# Patient Record
Sex: FEMALE | Race: WHITE | NOT HISPANIC OR LATINO | Employment: PART TIME | URBAN - METROPOLITAN AREA
[De-identification: names, ages, dates, MRNs, and addresses within clinical notes are randomized per-mention and may not be internally consistent; named-entity substitution may affect disease eponyms.]

---

## 2017-09-23 ENCOUNTER — APPOINTMENT (EMERGENCY)
Dept: CT IMAGING | Facility: HOSPITAL | Age: 57
End: 2017-09-23
Payer: COMMERCIAL

## 2017-09-23 ENCOUNTER — HOSPITAL ENCOUNTER (EMERGENCY)
Facility: HOSPITAL | Age: 57
Discharge: HOME | End: 2017-09-23
Attending: EMERGENCY MEDICINE
Payer: COMMERCIAL

## 2017-09-23 VITALS
HEART RATE: 57 BPM | WEIGHT: 120 LBS | HEIGHT: 62 IN | RESPIRATION RATE: 19 BRPM | SYSTOLIC BLOOD PRESSURE: 136 MMHG | DIASTOLIC BLOOD PRESSURE: 83 MMHG | OXYGEN SATURATION: 99 % | BODY MASS INDEX: 22.08 KG/M2 | TEMPERATURE: 98 F

## 2017-09-23 DIAGNOSIS — R10.32 LEFT LOWER QUADRANT PAIN: Primary | ICD-10-CM

## 2017-09-23 LAB
ALBUMIN SERPL BCP-MCNC: 4.1 G/DL (ref 3.5–5)
ALP SERPL-CCNC: 80 U/L (ref 46–116)
ALT SERPL W P-5'-P-CCNC: 29 U/L (ref 12–78)
ANION GAP SERPL CALCULATED.3IONS-SCNC: 9 MMOL/L (ref 4–13)
AST SERPL W P-5'-P-CCNC: 21 U/L (ref 5–45)
BASOPHILS # BLD AUTO: 0.02 THOUSANDS/ΜL (ref 0–0.1)
BASOPHILS NFR BLD AUTO: 0 % (ref 0–1)
BILIRUB SERPL-MCNC: 0.7 MG/DL (ref 0.2–1)
BILIRUB UR QL STRIP: NEGATIVE
BUN SERPL-MCNC: 13 MG/DL (ref 5–25)
CALCIUM SERPL-MCNC: 9.2 MG/DL (ref 8.3–10.1)
CHLORIDE SERPL-SCNC: 102 MMOL/L (ref 100–108)
CLARITY UR: CLEAR
CO2 SERPL-SCNC: 26 MMOL/L (ref 21–32)
COLOR UR: YELLOW
CREAT SERPL-MCNC: 0.99 MG/DL (ref 0.6–1.3)
EOSINOPHIL # BLD AUTO: 0.14 THOUSAND/ΜL (ref 0–0.61)
EOSINOPHIL NFR BLD AUTO: 2 % (ref 0–6)
ERYTHROCYTE [DISTWIDTH] IN BLOOD BY AUTOMATED COUNT: 12.7 % (ref 11.6–15.1)
GFR SERPL CREATININE-BSD FRML MDRD: 63 ML/MIN/1.73SQ M
GLUCOSE SERPL-MCNC: 113 MG/DL (ref 65–140)
GLUCOSE UR STRIP-MCNC: NEGATIVE MG/DL
HCT VFR BLD AUTO: 44.3 % (ref 34.8–46.1)
HGB BLD-MCNC: 15.1 G/DL (ref 11.5–15.4)
HGB UR QL STRIP.AUTO: NEGATIVE
KETONES UR STRIP-MCNC: ABNORMAL MG/DL
LEUKOCYTE ESTERASE UR QL STRIP: NEGATIVE
LYMPHOCYTES # BLD AUTO: 1.09 THOUSANDS/ΜL (ref 0.6–4.47)
LYMPHOCYTES NFR BLD AUTO: 14 % (ref 14–44)
MCH RBC QN AUTO: 31.8 PG (ref 26.8–34.3)
MCHC RBC AUTO-ENTMCNC: 34.1 G/DL (ref 31.4–37.4)
MCV RBC AUTO: 93 FL (ref 82–98)
MONOCYTES # BLD AUTO: 0.37 THOUSAND/ΜL (ref 0.17–1.22)
MONOCYTES NFR BLD AUTO: 5 % (ref 4–12)
NEUTROPHILS # BLD AUTO: 6.2 THOUSANDS/ΜL (ref 1.85–7.62)
NEUTS SEG NFR BLD AUTO: 79 % (ref 43–75)
NITRITE UR QL STRIP: NEGATIVE
PH UR STRIP.AUTO: 6.5 [PH] (ref 4.5–8)
PLATELET # BLD AUTO: 304 THOUSANDS/UL (ref 149–390)
PMV BLD AUTO: 9.9 FL (ref 8.9–12.7)
POTASSIUM SERPL-SCNC: 3.9 MMOL/L (ref 3.5–5.3)
PROT SERPL-MCNC: 7.8 G/DL (ref 6.4–8.2)
PROT UR STRIP-MCNC: NEGATIVE MG/DL
RBC # BLD AUTO: 4.75 MILLION/UL (ref 3.81–5.12)
SODIUM SERPL-SCNC: 137 MMOL/L (ref 136–145)
SP GR UR STRIP.AUTO: <=1.005 (ref 1–1.03)
UROBILINOGEN UR QL STRIP.AUTO: 0.2 E.U./DL
WBC # BLD AUTO: 7.82 THOUSAND/UL (ref 4.31–10.16)

## 2017-09-23 PROCEDURE — 81003 URINALYSIS AUTO W/O SCOPE: CPT | Performed by: EMERGENCY MEDICINE

## 2017-09-23 PROCEDURE — 96375 TX/PRO/DX INJ NEW DRUG ADDON: CPT

## 2017-09-23 PROCEDURE — 96374 THER/PROPH/DIAG INJ IV PUSH: CPT

## 2017-09-23 PROCEDURE — 85025 COMPLETE CBC W/AUTO DIFF WBC: CPT | Performed by: EMERGENCY MEDICINE

## 2017-09-23 PROCEDURE — 36415 COLL VENOUS BLD VENIPUNCTURE: CPT | Performed by: EMERGENCY MEDICINE

## 2017-09-23 PROCEDURE — 99284 EMERGENCY DEPT VISIT MOD MDM: CPT

## 2017-09-23 PROCEDURE — 80053 COMPREHEN METABOLIC PANEL: CPT | Performed by: EMERGENCY MEDICINE

## 2017-09-23 PROCEDURE — 74177 CT ABD & PELVIS W/CONTRAST: CPT

## 2017-09-23 RX ORDER — DICYCLOMINE HCL 20 MG
20 TABLET ORAL EVERY 6 HOURS
Qty: 20 TABLET | Refills: 0 | Status: SHIPPED | OUTPATIENT
Start: 2017-09-23 | End: 2017-10-09 | Stop reason: ALTCHOICE

## 2017-09-23 RX ORDER — ONDANSETRON 2 MG/ML
4 INJECTION INTRAMUSCULAR; INTRAVENOUS ONCE
Status: COMPLETED | OUTPATIENT
Start: 2017-09-23 | End: 2017-09-23

## 2017-09-23 RX ADMIN — ONDANSETRON 4 MG: 2 INJECTION INTRAMUSCULAR; INTRAVENOUS at 09:47

## 2017-09-23 RX ADMIN — IOHEXOL 50 ML: 240 INJECTION, SOLUTION INTRATHECAL; INTRAVASCULAR; INTRAVENOUS; ORAL at 10:05

## 2017-09-23 RX ADMIN — HYDROMORPHONE HYDROCHLORIDE 1 MG: 1 INJECTION, SOLUTION INTRAMUSCULAR; INTRAVENOUS; SUBCUTANEOUS at 09:48

## 2017-09-23 RX ADMIN — IOHEXOL 100 ML: 350 INJECTION, SOLUTION INTRAVENOUS at 11:36

## 2017-09-26 ENCOUNTER — ALLSCRIPTS OFFICE VISIT (OUTPATIENT)
Dept: OTHER | Facility: OTHER | Age: 57
End: 2017-09-26

## 2017-09-26 DIAGNOSIS — Z12.31 ENCOUNTER FOR SCREENING MAMMOGRAM FOR MALIGNANT NEOPLASM OF BREAST: ICD-10-CM

## 2017-09-26 DIAGNOSIS — M54.10 RADICULOPATHY: ICD-10-CM

## 2017-10-08 ENCOUNTER — GENERIC CONVERSION - ENCOUNTER (OUTPATIENT)
Dept: OTHER | Facility: OTHER | Age: 57
End: 2017-10-08

## 2017-10-09 ENCOUNTER — HOSPITAL ENCOUNTER (EMERGENCY)
Facility: HOSPITAL | Age: 57
Discharge: HOME | End: 2017-10-09
Attending: EMERGENCY MEDICINE | Admitting: EMERGENCY MEDICINE
Payer: COMMERCIAL

## 2017-10-09 VITALS
WEIGHT: 116 LBS | DIASTOLIC BLOOD PRESSURE: 69 MMHG | RESPIRATION RATE: 22 BRPM | BODY MASS INDEX: 21.22 KG/M2 | SYSTOLIC BLOOD PRESSURE: 112 MMHG | HEART RATE: 117 BPM | TEMPERATURE: 98.3 F | OXYGEN SATURATION: 99 %

## 2017-10-09 DIAGNOSIS — M79.652 PAIN OF LEFT THIGH: Primary | ICD-10-CM

## 2017-10-09 PROCEDURE — 99283 EMERGENCY DEPT VISIT LOW MDM: CPT

## 2017-10-09 PROCEDURE — 96372 THER/PROPH/DIAG INJ SC/IM: CPT

## 2017-10-09 RX ORDER — NAPROXEN 500 MG/1
500 TABLET ORAL 2 TIMES DAILY WITH MEALS
Qty: 30 TABLET | Refills: 0 | Status: SHIPPED | OUTPATIENT
Start: 2017-10-09 | End: 2020-05-05 | Stop reason: ALTCHOICE

## 2017-10-09 RX ORDER — KETOROLAC TROMETHAMINE 30 MG/ML
30 INJECTION, SOLUTION INTRAMUSCULAR; INTRAVENOUS ONCE
Status: COMPLETED | OUTPATIENT
Start: 2017-10-09 | End: 2017-10-09

## 2017-10-09 RX ORDER — METHOCARBAMOL 500 MG/1
500 TABLET, FILM COATED ORAL ONCE
Status: COMPLETED | OUTPATIENT
Start: 2017-10-09 | End: 2017-10-09

## 2017-10-09 RX ORDER — METHOCARBAMOL 500 MG/1
500 TABLET, FILM COATED ORAL 4 TIMES DAILY PRN
Qty: 20 TABLET | Refills: 0 | Status: SHIPPED | OUTPATIENT
Start: 2017-10-09 | End: 2020-04-22 | Stop reason: SDUPTHER

## 2017-10-09 RX ORDER — PREDNISONE 10 MG/1
TABLET ORAL DAILY
COMMUNITY
End: 2020-04-22 | Stop reason: ALTCHOICE

## 2017-10-09 RX ADMIN — KETOROLAC TROMETHAMINE 30 MG: 30 INJECTION, SOLUTION INTRAMUSCULAR at 19:47

## 2017-10-09 RX ADMIN — METHOCARBAMOL 500 MG: 500 TABLET ORAL at 19:46

## 2017-10-09 NOTE — DISCHARGE INSTRUCTIONS
Leg Pain   WHAT YOU NEED TO KNOW:   Leg pain may be caused by a variety of health conditions  Your tests did not show any broken bones or blood clots  DISCHARGE INSTRUCTIONS:   Return to the emergency department if:   · You have a fever  · Your leg starts to swell  · Your leg pain gets worse  · You have numbness or tingling in your leg or toes  · You cannot put any weight on or move your leg  Contact your healthcare provider if:   · Your pain does not decrease, even after treatment  · You have questions or concerns about your condition or care  Medicines:   · NSAIDs , such as ibuprofen, help decrease swelling, pain, and fever  This medicine is available with or without a doctor's order  NSAIDs can cause stomach bleeding or kidney problems in certain people  If you take blood thinner medicine, always ask your healthcare provider if NSAIDs are safe for you  Always read the medicine label and follow directions  · Take your medicine as directed  Contact your healthcare provider if you think your medicine is not helping or if you have side effects  Tell him of her if you are allergic to any medicine  Keep a list of the medicines, vitamins, and herbs you take  Include the amounts, and when and why you take them  Bring the list or the pill bottles to follow-up visits  Carry your medicine list with you in case of an emergency  Follow up with your healthcare provider as directed: You may need more tests to find the cause of your leg pain  You may need to see an orthopedic specialist or a physical therapist  Write down your questions so you remember to ask them during your visits  Manage your leg pain:   · Rest  your injured leg so that it can heal  You may need an immobilizer, brace, or splint to limit the movement of your leg  You may need to avoid putting any weight on your leg for at least 48 hours  Return to normal activities as directed      · Ice  the injury for 20 minutes every 4 hours for up to 24 hours, or as directed  Use an ice pack, or put crushed ice in a plastic bag  Cover it with a towel to protect your skin  Ice helps prevent tissue damage and decreases swelling and pain  · Elevate  your injured leg above the level of your heart as often as you can  This will help decrease swelling and pain  If possible, prop your leg on pillows or blankets to keep the area elevated comfortably  · Use assistive devices as directed  You may need to use a cane or crutches  Assistive devices help decrease pain and pressure on your leg when you walk  Ask your healthcare provider for more information about assistive devices and how to use them correctly  · Maintain a healthy weight  Extra body weight can cause pressure and pain in your hip, knee, and ankle joints  Ask your healthcare provider how much you should weigh  Ask him to help you create a weight loss plan if you are overweight  © 2017 [employer] Information is for End User's use only and may not be sold, redistributed or otherwise used for commercial purposes  All illustrations and images included in CareNotes® are the copyrighted property of [employer]  or Virgil Crowe  The above information is an  only  It is not intended as medical advice for individual conditions or treatments  Talk to your doctor, nurse or pharmacist before following any medical regimen to see if it is safe and effective for you

## 2017-10-10 NOTE — ED PROVIDER NOTES
History  Chief Complaint   Patient presents with    Back Pain     Pt stated this started 3 weeks ago, PCP seen and diagnosed sciatica, steroids given, pt stated last week went to work feeling better and saturday had a sudden onset,  PCP called in steroids   Leg Pain       Patient complaining of left thigh pain 3 weeks duration  She went through a course of steroids in the pain improved but then she went in clean the house and the pain returned  Pain radiates up to the left buttock is described as a sharp sensation  She actually has been taking Percocet without relief  She denies any numbness or tingling any loss of bowel or bladder control  She denies any weakness or fevers  Back Pain   Associated symptoms: leg pain    Leg Pain   Associated symptoms: back pain        Prior to Admission Medications   Prescriptions Last Dose Informant Patient Reported? Taking?   predniSONE 10 mg tablet 10/9/2017 at Unknown time  Yes Yes   Sig: Take by mouth daily      Facility-Administered Medications: None       History reviewed  No pertinent past medical history  Past Surgical History:   Procedure Laterality Date    APPENDECTOMY       SECTION         History reviewed  No pertinent family history  I have reviewed and agree with the history as documented  Social History   Substance Use Topics    Smoking status: Never Smoker    Smokeless tobacco: Never Used    Alcohol use Yes        Review of Systems   Musculoskeletal: Positive for back pain  All other systems reviewed and are negative  Physical Exam  ED Triage Vitals [10/09/17 1833]   Temperature Pulse Respirations Blood Pressure SpO2   98 3 °F (36 8 °C) (!) 117 22 112/69 99 %      Temp Source Heart Rate Source Patient Position - Orthostatic VS BP Location FiO2 (%)   Tympanic Monitor Sitting Right arm --      Pain Score       Worst Possible Pain           Physical Exam   Constitutional: She appears well-developed and well-nourished  HENT:   Mouth/Throat: Oropharynx is clear and moist    Eyes: Conjunctivae and EOM are normal  Pupils are equal, round, and reactive to light  Neck: Normal range of motion  Neck supple  No spinous process tenderness present  Cardiovascular: Normal rate, regular rhythm, normal heart sounds and intact distal pulses  Pulmonary/Chest: Effort normal and breath sounds normal  No respiratory distress  She has no wheezes  Abdominal: Soft  Bowel sounds are normal  She exhibits no distension  There is no tenderness  Musculoskeletal: Normal range of motion  Lumbar back: She exhibits tenderness, pain and spasm  She exhibits normal range of motion, no bony tenderness, no swelling, no edema and normal pulse  Back:         Left upper leg: She exhibits no tenderness, no bony tenderness, no swelling, no edema, no deformity and no laceration  Negative straight leg raise bilateral   Neurological: She is alert  She has normal strength  No sensory deficit  GCS eye subscore is 4  GCS verbal subscore is 5  GCS motor subscore is 6  Skin: Skin is warm and dry  No rash noted  Psychiatric: She has a normal mood and affect  Nursing note and vitals reviewed  ED Medications  Medications   ketorolac (TORADOL) 30 mg/mL injection 30 mg (30 mg Intramuscular Given 10/9/17 1947)   methocarbamol (ROBAXIN) tablet 500 mg (500 mg Oral Given 10/9/17 1946)       Diagnostic Studies  Labs Reviewed - No data to display    No orders to display       Procedures  Procedures      Phone Contacts  ED Phone Contact    ED Course  ED Course                                MDM  Number of Diagnoses or Management Options  Pain of left thigh: established and worsening  Patient Progress  Patient progress: improved    CritCare Time    Disposition  Final diagnoses:   Pain of left thigh     ED Disposition     ED Disposition Condition Comment    Discharge  Jo Ann Huber discharge to home/self care      Condition at discharge: Good Follow-up Information     Follow up With Specialties Details Why [address], [address] In 2 days As needed [address] [facility] [place] [address] [place] [address]  [phone]          Discharge Medication List as of 10/9/2017  7:41 PM      START taking these medications    Details   methocarbamol (ROBAXIN) 500 mg tablet Take 1 tablet by mouth 4 (four) times a day as needed for muscle spasms, Starting Mon 10/9/2017, Print      naproxen (NAPROSYN) 500 mg tablet Take 1 tablet by mouth 2 (two) times a day with meals, Starting Mon 10/9/2017, Print         CONTINUE these medications which have NOT CHANGED    Details   predniSONE 10 mg tablet Take by mouth daily, Historical Med           No discharge procedures on file      ED Provider  Electronically Signed by       Hemanth Cardona DO  10/10/17 0020

## 2017-10-19 ENCOUNTER — GENERIC CONVERSION - ENCOUNTER (OUTPATIENT)
Dept: OTHER | Facility: OTHER | Age: 57
End: 2017-10-19

## 2017-10-26 ENCOUNTER — APPOINTMENT (OUTPATIENT)
Dept: PHYSICAL THERAPY | Facility: CLINIC | Age: 57
End: 2017-10-26
Payer: COMMERCIAL

## 2017-10-26 DIAGNOSIS — M54.10 RADICULOPATHY: ICD-10-CM

## 2017-10-26 PROCEDURE — G8990 OTHER PT/OT CURRENT STATUS: HCPCS

## 2017-10-26 PROCEDURE — 97161 PT EVAL LOW COMPLEX 20 MIN: CPT

## 2017-10-26 PROCEDURE — G8991 OTHER PT/OT GOAL STATUS: HCPCS

## 2017-10-30 ENCOUNTER — GENERIC CONVERSION - ENCOUNTER (OUTPATIENT)
Dept: OTHER | Facility: OTHER | Age: 57
End: 2017-10-30

## 2018-01-13 VITALS
HEART RATE: 68 BPM | DIASTOLIC BLOOD PRESSURE: 88 MMHG | RESPIRATION RATE: 14 BRPM | BODY MASS INDEX: 21.4 KG/M2 | TEMPERATURE: 97.6 F | HEIGHT: 62 IN | WEIGHT: 116.31 LBS | SYSTOLIC BLOOD PRESSURE: 118 MMHG

## 2018-01-15 NOTE — MISCELLANEOUS
Message  Message Free Text Note Form: Patient was seen week and half ago in our office and was treated with prednisone for which she call sciatic pain  She did very well and suddenly today he came back  She has pain in her mid but and feels like there is a hot poker going straight down the back leg  She feels like her piriformis muscle must be in spasm  She also has a call out to her chiropractor  She has no loss of bowel or bladder continence, no lower extremity weakness  Plan: We will redo another course of steroids  If this does not take care of the patient will need to see us again  Seeing her chiropractor sounds like a good idea  She is not to take any other nonsteroidals well on the prednisone  She can however take arthritis strength Tylenol 2 together 3 times daily  Anything changes or she needs help call p r n  Plan    1   PredniSONE 10 MG Oral Tablet; TAKE 4 TABLETS DAILY FOR 2 DAYS,3   TABLETS DAILY FOR 2 DAYS, 2 TABLETS DAILY FOR 2 DAYS AND 1 TABLET DAILY FOR   2 DAYS, THEN STOP    Signatures   Electronically signed by : Suresh Novak DO; Oct  8 2017 10:48AM EST                       (Author)

## 2018-01-22 VITALS
DIASTOLIC BLOOD PRESSURE: 82 MMHG | RESPIRATION RATE: 12 BRPM | HEIGHT: 61 IN | WEIGHT: 113 LBS | BODY MASS INDEX: 21.34 KG/M2 | SYSTOLIC BLOOD PRESSURE: 114 MMHG | HEART RATE: 62 BPM

## 2019-08-23 ENCOUNTER — TELEPHONE (OUTPATIENT)
Dept: FAMILY MEDICINE CLINIC | Facility: CLINIC | Age: 59
End: 2019-08-23

## 2020-04-22 ENCOUNTER — TELEMEDICINE (OUTPATIENT)
Dept: FAMILY MEDICINE CLINIC | Facility: CLINIC | Age: 60
End: 2020-04-22
Payer: COMMERCIAL

## 2020-04-22 DIAGNOSIS — M25.552 ACUTE PAIN OF LEFT HIP: ICD-10-CM

## 2020-04-22 DIAGNOSIS — M54.42 ACUTE LEFT-SIDED LOW BACK PAIN WITH LEFT-SIDED SCIATICA: ICD-10-CM

## 2020-04-22 DIAGNOSIS — Z12.39 SCREENING FOR MALIGNANT NEOPLASM OF BREAST: ICD-10-CM

## 2020-04-22 DIAGNOSIS — M54.42 ACUTE LEFT-SIDED LOW BACK PAIN WITH LEFT-SIDED SCIATICA: Primary | ICD-10-CM

## 2020-04-22 PROCEDURE — 99213 OFFICE O/P EST LOW 20 MIN: CPT | Performed by: FAMILY MEDICINE

## 2020-04-22 RX ORDER — PREDNISONE 10 MG/1
TABLET ORAL
Qty: 20 TABLET | Refills: 0 | Status: SHIPPED | OUTPATIENT
Start: 2020-04-22

## 2020-04-22 RX ORDER — METHOCARBAMOL 500 MG/1
500 TABLET, FILM COATED ORAL 4 TIMES DAILY PRN
Qty: 20 TABLET | Refills: 0 | Status: SHIPPED | OUTPATIENT
Start: 2020-04-22 | End: 2020-04-22 | Stop reason: SDUPTHER

## 2020-04-22 RX ORDER — METHOCARBAMOL 500 MG/1
500 TABLET, FILM COATED ORAL 4 TIMES DAILY PRN
Qty: 20 TABLET | Refills: 0 | Status: SHIPPED | OUTPATIENT
Start: 2020-04-22 | End: 2020-05-05 | Stop reason: ALTCHOICE

## 2020-04-23 ENCOUNTER — TELEPHONE (OUTPATIENT)
Dept: FAMILY MEDICINE CLINIC | Facility: CLINIC | Age: 60
End: 2020-04-23

## 2020-05-05 ENCOUNTER — OFFICE VISIT (OUTPATIENT)
Dept: FAMILY MEDICINE CLINIC | Facility: CLINIC | Age: 60
End: 2020-05-05
Payer: COMMERCIAL

## 2020-05-05 VITALS
HEIGHT: 61 IN | RESPIRATION RATE: 15 BRPM | HEART RATE: 80 BPM | WEIGHT: 111.9 LBS | DIASTOLIC BLOOD PRESSURE: 64 MMHG | TEMPERATURE: 98.4 F | SYSTOLIC BLOOD PRESSURE: 108 MMHG | BODY MASS INDEX: 21.13 KG/M2

## 2020-05-05 DIAGNOSIS — M54.42 ACUTE LEFT-SIDED LOW BACK PAIN WITH LEFT-SIDED SCIATICA: Primary | ICD-10-CM

## 2020-05-05 PROCEDURE — 1036F TOBACCO NON-USER: CPT | Performed by: NURSE PRACTITIONER

## 2020-05-05 PROCEDURE — 3008F BODY MASS INDEX DOCD: CPT | Performed by: NURSE PRACTITIONER

## 2020-05-05 PROCEDURE — 99213 OFFICE O/P EST LOW 20 MIN: CPT | Performed by: NURSE PRACTITIONER

## 2020-05-11 ENCOUNTER — EVALUATION (OUTPATIENT)
Dept: PHYSICAL THERAPY | Facility: CLINIC | Age: 60
End: 2020-05-11
Payer: COMMERCIAL

## 2020-05-11 DIAGNOSIS — M54.42 ACUTE LEFT-SIDED LOW BACK PAIN WITH LEFT-SIDED SCIATICA: ICD-10-CM

## 2020-05-11 PROBLEM — M54.50 LOW BACK PAIN: Status: ACTIVE | Noted: 2020-05-11

## 2020-05-11 PROCEDURE — 97161 PT EVAL LOW COMPLEX 20 MIN: CPT

## 2020-05-11 PROCEDURE — 97110 THERAPEUTIC EXERCISES: CPT

## 2020-05-18 ENCOUNTER — OFFICE VISIT (OUTPATIENT)
Dept: PHYSICAL THERAPY | Facility: CLINIC | Age: 60
End: 2020-05-18
Payer: COMMERCIAL

## 2020-05-18 DIAGNOSIS — M54.42 ACUTE LEFT-SIDED LOW BACK PAIN WITH LEFT-SIDED SCIATICA: Primary | ICD-10-CM

## 2020-05-18 PROCEDURE — 97112 NEUROMUSCULAR REEDUCATION: CPT

## 2020-05-18 PROCEDURE — 97110 THERAPEUTIC EXERCISES: CPT

## 2020-05-18 PROCEDURE — 97140 MANUAL THERAPY 1/> REGIONS: CPT

## 2020-06-01 ENCOUNTER — OFFICE VISIT (OUTPATIENT)
Dept: PHYSICAL THERAPY | Facility: CLINIC | Age: 60
End: 2020-06-01
Payer: COMMERCIAL

## 2020-06-01 DIAGNOSIS — M54.42 ACUTE LEFT-SIDED LOW BACK PAIN WITH LEFT-SIDED SCIATICA: Primary | ICD-10-CM

## 2020-06-01 PROCEDURE — 97112 NEUROMUSCULAR REEDUCATION: CPT

## 2020-06-01 PROCEDURE — 97110 THERAPEUTIC EXERCISES: CPT

## 2020-06-01 PROCEDURE — 97140 MANUAL THERAPY 1/> REGIONS: CPT

## 2020-06-09 ENCOUNTER — APPOINTMENT (OUTPATIENT)
Dept: PHYSICAL THERAPY | Facility: CLINIC | Age: 60
End: 2020-06-09
Payer: COMMERCIAL

## 2020-06-16 ENCOUNTER — OFFICE VISIT (OUTPATIENT)
Dept: PHYSICAL THERAPY | Facility: CLINIC | Age: 60
End: 2020-06-16
Payer: COMMERCIAL

## 2020-06-16 DIAGNOSIS — M54.42 ACUTE LEFT-SIDED LOW BACK PAIN WITH LEFT-SIDED SCIATICA: Primary | ICD-10-CM

## 2020-06-16 PROCEDURE — 97110 THERAPEUTIC EXERCISES: CPT

## 2020-06-16 PROCEDURE — 97140 MANUAL THERAPY 1/> REGIONS: CPT

## 2020-06-16 PROCEDURE — 97112 NEUROMUSCULAR REEDUCATION: CPT

## 2020-06-30 ENCOUNTER — OFFICE VISIT (OUTPATIENT)
Dept: PHYSICAL THERAPY | Facility: CLINIC | Age: 60
End: 2020-06-30
Payer: COMMERCIAL

## 2020-06-30 DIAGNOSIS — M54.42 ACUTE LEFT-SIDED LOW BACK PAIN WITH LEFT-SIDED SCIATICA: Primary | ICD-10-CM

## 2020-06-30 PROCEDURE — 97112 NEUROMUSCULAR REEDUCATION: CPT

## 2020-06-30 PROCEDURE — 97140 MANUAL THERAPY 1/> REGIONS: CPT

## 2020-06-30 PROCEDURE — 97110 THERAPEUTIC EXERCISES: CPT

## 2020-07-07 ENCOUNTER — EVALUATION (OUTPATIENT)
Dept: PHYSICAL THERAPY | Facility: CLINIC | Age: 60
End: 2020-07-07
Payer: COMMERCIAL

## 2020-07-07 DIAGNOSIS — M54.42 ACUTE LEFT-SIDED LOW BACK PAIN WITH LEFT-SIDED SCIATICA: Primary | ICD-10-CM

## 2020-07-07 PROCEDURE — 97112 NEUROMUSCULAR REEDUCATION: CPT

## 2020-07-07 PROCEDURE — 97140 MANUAL THERAPY 1/> REGIONS: CPT

## 2020-07-07 PROCEDURE — 97164 PT RE-EVAL EST PLAN CARE: CPT

## 2020-07-07 PROCEDURE — 97110 THERAPEUTIC EXERCISES: CPT

## 2020-07-07 NOTE — PROGRESS NOTES
Daily Note/Re-evaluation     Today's date: 2020  Patient name: Yulia Naranjo  : 1960  MRN: [id number]  Referring provider: CARROLL Vuong  Dx:   Encounter Diagnosis     ICD-10-CM    1  Acute left-sided low back pain with left-sided sciatica M54 42                   Subjective: Pt states that she is about 85%-90% better  She states that she attempted yard work for the first time after initial injury and notes that after 30-45 minutes, she notes low back mm  Fatigue and tenderness rated at 3/10 therefore she stopped  Pt states that she rolled out lower back which helped with pain  Objective: See treatment diary below    Assessment  Assessment details: Pt is a 62 y o  female presenting after completing 6 visits of PT thus far with good HEP compliance s/p acute low back injury  PT findings include overall strength improvements in hips and core, pt also presents with significant pain improvements at worst  Pt still lacking subjective confidence in lifting and presents with likely lumbar paraspinal endurance deficits per pt presentation after attempting yard work  Pt will benefit from continued skilled PT to achieve functional goals  Impairments: abnormal muscle tone, impaired physical strength, lacks appropriate home exercise program, pain with function and poor body mechanics  Functional limitations: fear of lifting objects from floor, pain with lifting, pain with prolonged sitting/standing  Symptom irritability: moderateUnderstanding of Dx/Px/POC: good   Prognosis: good    Goals  1  Pt will demonstrate understanding of HEP and POC in order to improve compliance with course of rehab in 2 weeks  (MET)  2  Pt will demonstrate awareness of appropriate posture/biomechanics with dynamic reaching activities and lifting in order to decrease excessive loads/pain with ADL's by d/c (MET)  3   Pt will demonstrate at least 4+/5 hip extension/abduction strength in order to decrease compensation of lumbar spine and allow pt to return to performing yard work by d/c  (85% met)  4  Pt will achieve at least 80% GRS in order for pt to participate in yoga by d/c (MET)  5  Pt pain at worst to decrease to less than 2/10 in order to lift objects and perform yard work/adls by d/c  (Ongoing)    Plan  Patient would benefit from: skilled physical therapy  Planned therapy interventions: joint mobilization, manual therapy, neuromuscular re-education, postural training, patient education, strengthening, stretching, abdominal trunk stabilization, body mechanics training, home exercise program, graded exercise, therapeutic activities and therapeutic exercise  Frequency: 1x week  Duration in weeks: 8  Treatment plan discussed with: patient        Subjective Evaluation    History of Present Illness    Pt goals: ability to perform yard work, return to performing yoga recreationally, be able to lift bag of pellets without pain, decreased fear of lifting      Quality of life: good    Pain  Current pain rating: 3  At best pain ratin  At worst pain rating: 3  Location: L lower back, (front of thigh)  Quality: sharp and dull ache  Relieving factors: medications, rest, ice and heat  Aggravating factors: lifting  Progression: improved    Treatments  Previous treatment: medication  Current treatment: medication  Patient Goals  Patient goals for therapy: decreased pain, increased strength, independence with ADLs/IADLs and return to sport/leisure activities          Objective    Lumbar ROM (R/L):  Flex: 40°  Ext: 30°  RSB: 51 5 cm  LSB: 50 5 cm    Repeated Motions:  Unremarkable       Max Open/Close: unremarkable    LQS:  Dermatomes intact B L1-S2  Myotomes: strong symmetrical B L2- S1     Strength (R;L):  Hip Flex: 4/5; 4/5    Hip ABD: 4+/5; 4/5 (L QL tenderness)  Hip Ext: 4+/5; 4+/5  Hip ER: 4+/5; 4+/5    Lumbar Joint Mobility:  CPA: Normal L1-S1  UPA: Normal L1-L5 (L tenderness at L2-L3)    Palpation: +TTP  L paraspinals, L quadratus lumborum (pain recreation with L quadratus lumborum palpation)  Note: increased L QL activity with R hip abduction (compensation noticed)  Special Tests:  SLR: negative  Femoral nn  Tension: negative B        Plan: Continue per plan of care  Precautions: None      Manuals 5/11 5/18 6/1 6/16 6/30 7/7       STM paraspinals, quadratus lumborum, gluteals  DL DL DL DL DL       Manual Core Stabilization  supine w/ PPT supine w/ PPT                                    Neuro Re-Ed             Deadbug  2x10 w/ PPT 10x ea  VC/TC 10x ea  VC/TC 2x5 ea  VC/TC        Paloff Press   10x 5" Green B  10x 5" Green B + rot 10x 5" Green B + rot        Bird Dog  LE only 10x  LE only 10x B  Mermaid (s/l leg lift)    10" 5x B         Floating table top (quadruped with knees off)             Supine deadbug core stabilization   Hip flexed, white bar press into leg 10"x5 Hip flexed, white bar press into leg 10"x5 Hip flexed, white bar press into leg 10"x5 Hip flexed, 30"x3       Plank     mod plank; bosu w/ tennis ball cw/ccw 10x ea  mod plank; bosu w/ tennis ball cw/ccw 10x ea  Alternating superman      2x10       Kneeling core stabilization   manual perturbation 2x30"          Ther Ex             Posterior pelvic tilt 15x (HEP) Re-ed TC/VC Wall sit w/ pelvic tilt 30"x2 Wall sit w/ pelvic tilt 30"x2         S/l hip abduction  10x B 3" hold (HEP)     30x B 1#       Supine Bridge 15x 5" hold (HEP)            S/L clamshell Grn 10x B (HEP)   Black 30x B np Black 30x B       Fire hydrant  2x10 ea  2x10 ea  2x10 ea  2x10 ea  2x10 red band        Prone hip ext  30x ea  30x ea             Single leg RDL    15x B         Alternating superman             Squat lift/straight leg lift (dead lift)     6" step: 9#/6# 10x ea 6" step: 9#/6# 2x10 ea        Ther Activity                                       Gait Training                                       Modalities

## 2020-07-14 ENCOUNTER — APPOINTMENT (OUTPATIENT)
Dept: PHYSICAL THERAPY | Facility: CLINIC | Age: 60
End: 2020-07-14
Payer: COMMERCIAL

## 2020-07-16 ENCOUNTER — APPOINTMENT (OUTPATIENT)
Dept: PHYSICAL THERAPY | Facility: CLINIC | Age: 60
End: 2020-07-16
Payer: COMMERCIAL

## 2020-07-21 ENCOUNTER — APPOINTMENT (OUTPATIENT)
Dept: PHYSICAL THERAPY | Facility: CLINIC | Age: 60
End: 2020-07-21
Payer: COMMERCIAL

## 2020-07-28 ENCOUNTER — OFFICE VISIT (OUTPATIENT)
Dept: PHYSICAL THERAPY | Facility: CLINIC | Age: 60
End: 2020-07-28
Payer: COMMERCIAL

## 2020-07-28 DIAGNOSIS — M54.42 ACUTE LEFT-SIDED LOW BACK PAIN WITH LEFT-SIDED SCIATICA: Primary | ICD-10-CM

## 2020-07-28 PROCEDURE — 97110 THERAPEUTIC EXERCISES: CPT

## 2020-07-28 PROCEDURE — 97112 NEUROMUSCULAR REEDUCATION: CPT

## 2020-07-28 PROCEDURE — 97140 MANUAL THERAPY 1/> REGIONS: CPT

## 2020-07-28 NOTE — PROGRESS NOTES
Daily Note     Today's date: 2020  Patient name: Parag West  : 1960  MRN: [id number]  Referring provider: CARROLL Weinberg  Dx:   Encounter Diagnosis     ICD-10-CM    1  Acute left-sided low back pain with left-sided sciatica M54 42                   Subjective: Pt reports that she has not been as consistent with her exercises as she has been busy the past 2 weeks  She does note however feeling very good and in general not having any back pain and no radicular sx  She also reports femoral nn  Pain sx has not been bothering her  Objective: See treatment diary below      Assessment: Pt with good progress in terms of pain, therefore exercise focused more on functional movements  Pt will benefit from additional functional focused exercises so that pt can return to PLOF  Plan: Continue per plan of care  Precautions: None      Manuals       STM paraspinals, quadratus lumborum, gluteals  DL DL DL DL DL DL      Manual Core Stabilization  supine w/ PPT supine w/ PPT                                    Neuro Re-Ed             Deadbug  2x10 w/ PPT 10x ea  VC/TC 10x ea  VC/TC 2x5 ea  VC/TC        Paloff Press   10x 5" Green B  10x 5" Green B + rot 10x 5" Green B + rot  20x 5" Green B + rot      Bird Dog  LE only 10x  LE only 10x B  Mermaid (s/l leg lift)    10" 5x B         Floating table top (quadruped with knees off)             Supine deadbug core stabilization   Hip flexed, white bar press into leg 10"x5 Hip flexed, white bar press into leg 10"x5 Hip flexed, white bar press into leg 10"x5 Hip flexed, 30"x3 Hip flexed, white bar press into leg 10x5"      Plank     mod plank; bosu w/ tennis ball cw/ccw 10x ea  mod plank; bosu w/ tennis ball cw/ccw 10x ea          Alternating superman      2x10       Kneeling core stabilization   manual perturbation 2x30"          Ther Ex             Posterior pelvic tilt 15x (HEP) Re-ed TC/VC Wall sit w/ pelvic tilt 30"x2 Wall sit w/ pelvic tilt 30"x2         S/l hip abduction  10x B 3" hold (HEP)     30x B 1#       Supine Bridge 15x 5" hold (HEP)            S/L clamshell Grn 10x B (HEP)   Black 30x B np Black 30x B       Fire hydrant  2x10 ea  2x10 ea  2x10 ea  2x10 ea  2x10 red band        Prone hip ext  30x ea  30x ea             Single leg RDL    15x B         Alternating superman             Squat lift/straight leg lift (dead lift)     6" step: 9#/6# 10x ea 6" step: 9#/6# 2x10 ea  6" step: 9#/9# 2x10 ea      Leg Press w/ AB       30# 3x10      Squat hold + isometric ab crunch       PB on lap 5"x15      Lateral Band Walk       Blue @ ankle 12'x8      Ther Activity                                       Gait Training                                       Modalities

## 2020-08-04 ENCOUNTER — APPOINTMENT (OUTPATIENT)
Dept: PHYSICAL THERAPY | Facility: CLINIC | Age: 60
End: 2020-08-04
Payer: COMMERCIAL

## 2020-08-13 ENCOUNTER — APPOINTMENT (OUTPATIENT)
Dept: PHYSICAL THERAPY | Facility: CLINIC | Age: 60
End: 2020-08-13
Payer: COMMERCIAL

## 2020-08-18 ENCOUNTER — OFFICE VISIT (OUTPATIENT)
Dept: PHYSICAL THERAPY | Facility: CLINIC | Age: 60
End: 2020-08-18
Payer: COMMERCIAL

## 2020-08-18 DIAGNOSIS — M54.42 ACUTE LEFT-SIDED LOW BACK PAIN WITH LEFT-SIDED SCIATICA: Primary | ICD-10-CM

## 2020-08-18 PROCEDURE — 97112 NEUROMUSCULAR REEDUCATION: CPT

## 2020-08-18 PROCEDURE — 97140 MANUAL THERAPY 1/> REGIONS: CPT

## 2020-08-18 PROCEDURE — 97110 THERAPEUTIC EXERCISES: CPT

## 2020-08-18 NOTE — PROGRESS NOTES
Daily Note     Today's date: 2020  Patient name: Le Alcantara  : 1960  MRN: [id number]  Referring provider: CARROLL Medellin  Dx:   Encounter Diagnosis     ICD-10-CM    1  Acute left-sided low back pain with left-sided sciatica  M54 42                   Subjective:  Pt reports that she has been doing well in regards to her back  She states that there are some instances where she will get some back pain, however she performs her exercises and self lumbar release which relieves her pain  Her final goal is to be able to lift 40# of pellets off the floor; she has only attempted around 20# thus far  Objective: See treatment diary below      Assessment: Pt tolerates exercises well and progressions  Box lifts initiated while monitoring low back fatigue with no complaints  Pt does note by last set of 5 when lifting 25#, some low back fatigue but no pain reported  Pt will benefit from continued skilled PT to reach goals  Plan: Continue per plan of care  Precautions: None       Manuals      STM paraspinals, quadratus lumborum, gluteals  DL DL DL DL DL DL DL     Manual Core Stabilization  supine w/ PPT supine w/ PPT                                    Neuro Re-Ed             Deadbug  2x10 w/ PPT 10x ea  VC/TC 10x ea  VC/TC 2x5 ea  VC/TC        Paloff Press   10x 5" Green B  10x 5" Green B + rot 10x 5" Green B + rot  20x 5" Green B + rot 20x + rot Blue     Bird Dog  LE only 10x  LE only 10x B  Mermaid (s/l leg lift)    10" 5x B         Floating table top (quadruped with knees off)             Supine deadbug core stabilization   Hip flexed, white bar press into leg 10"x5 Hip flexed, white bar press into leg 10"x5 Hip flexed, white bar press into leg 10"x5 Hip flexed, 30"x3 Hip flexed, white bar press into leg 10x5" Hip flexed, white bar press into leg 10x5"     Plank     mod plank; bosu w/ tennis ball cw/ccw 10x ea   mod plank; bosu w/ tennis ball cw/ccw 10x ea  Alternating superman      2x10       Kneeling core stabilization   manual perturbation 2x30"          Ther Ex             Posterior pelvic tilt 15x (HEP) Re-ed TC/VC Wall sit w/ pelvic tilt 30"x2 Wall sit w/ pelvic tilt 30"x2         S/l hip abduction  10x B 3" hold (HEP)     30x B 1#       Supine Bridge 15x 5" hold (HEP)            S/L clamshell Grn 10x B (HEP)   Black 30x B np Black 30x B       Fire hydrant  2x10 ea  2x10 ea  2x10 ea  2x10 ea  2x10 red band   2x10 grn band     Prone hip ext  30x ea  30x ea             Single leg RDL    15x B         Alternating superman             Squat lift/straight leg lift (dead lift)     6" step: 9#/6# 10x ea 6" step: 9#/6# 2x10 ea  6" step: 9#/9# 2x10 ea Box lift 15,20,25# 5x ea     Leg Press w/ AB       30# 3x10 40# 3x10     Squat hold + isometric ab crunch       PB on lap 5"x15 PB on lap 5"x10     Lateral Band Walk       Blue @ ankle 12'x8 Blue @ ankle 12'x8     Ther Activity                                       Gait Training                                       Modalities

## 2020-08-25 ENCOUNTER — APPOINTMENT (OUTPATIENT)
Dept: PHYSICAL THERAPY | Facility: CLINIC | Age: 60
End: 2020-08-25
Payer: COMMERCIAL

## 2020-09-01 ENCOUNTER — OFFICE VISIT (OUTPATIENT)
Dept: PHYSICAL THERAPY | Facility: CLINIC | Age: 60
End: 2020-09-01
Payer: COMMERCIAL

## 2020-09-01 DIAGNOSIS — M54.42 ACUTE LEFT-SIDED LOW BACK PAIN WITH LEFT-SIDED SCIATICA: Primary | ICD-10-CM

## 2020-09-01 PROCEDURE — 97110 THERAPEUTIC EXERCISES: CPT

## 2020-09-01 PROCEDURE — 97112 NEUROMUSCULAR REEDUCATION: CPT

## 2020-09-01 NOTE — PROGRESS NOTES
Daily Note     Today's date: 2020  Patient name: Petty Bennett  : 1960  MRN: [id number]  Referring provider: CARROLL Hernandez  Dx:   Encounter Diagnosis     ICD-10-CM    1  Acute left-sided low back pain with left-sided sciatica  M54 42                   Subjective: Pt reports that after last PT session with increase in weight of box lifts, she did not have any back pain or soreness; she reports that legs were more sore  Pt able to manage back pain and sx  Pt continues to have goal of lifting bag of pellets weighing 45# without pain  Objective: See treatment diary below      Assessment: Core exercises progressed today with fatigue noted  Box lifts also increased in weight with fatigue noted  Pt will benefit from continued progressions toward goal to lift object with no back pain  Plan: Continue per plan of care  Precautions: None       Manuals     STM paraspinals, quadratus lumborum, gluteals  DL DL DL DL DL DL DL DL    Manual Core Stabilization  supine w/ PPT supine w/ PPT                                    Neuro Re-Ed             Deadbug  2x10 w/ PPT 10x ea  VC/TC 10x ea  VC/TC 2x5 ea  VC/TC        Paloff Press   10x 5" Green B  10x 5" Green B + rot 10x 5" Green B + rot  20x 5" Green B + rot 20x + rot Blue 20x + rot Blue    Bird Dog  LE only 10x  LE only 10x B  Mermaid (s/l leg lift)    10" 5x B         Floating table top (quadruped with knees off)             Supine deadbug core stabilization   Hip flexed, white bar press into leg 10"x5 Hip flexed, white bar press into leg 10"x5 Hip flexed, white bar press into leg 10"x5 Hip flexed, 30"x3 Hip flexed, white bar press into leg 10x5" Hip flexed, white bar press into leg 10x5" Hip flexed, white bar press into leg 15x5"    Plank     mod plank; bosu w/ tennis ball cw/ccw 10x ea  mod plank; bosu w/ tennis ball cw/ccw 10x ea          Alternating superman      2x10       Kneeling core stabilization   manual perturbation 2x30"          Ther Ex             Posterior pelvic tilt 15x (HEP) Re-ed TC/VC Wall sit w/ pelvic tilt 30"x2 Wall sit w/ pelvic tilt 30"x2         S/l hip abduction  10x B 3" hold (HEP)     30x B 1#       Supine Bridge 15x 5" hold (HEP)        U/l bridge 10x ea (slow and controlled)    S/L clamshell Grn 10x B (HEP)   Black 30x B np Black 30x B       Fire hydrant  2x10 ea  2x10 ea  2x10 ea  2x10 ea  2x10 red band   2x10 grn band 2x10 grn band    Prone hip ext  30x ea  30x ea             Single leg RDL    15x B         Alternating superman             Squat lift/straight leg lift (dead lift)     6" step: 9#/6# 10x ea 6" step: 9#/6# 2x10 ea  6" step: 9#/9# 2x10 ea Box lift 15,20,25# 5x ea Box lift 30# 2x5 ea    Leg Press w/ AB       30# 3x10 40# 3x10 50# 3x10    Squat hold + isometric ab crunch       PB on lap 5"x15 PB on lap 5"x10 PB on lap 5"x10    Lateral Band Walk       Blue @ ankle 12'x8 Blue @ ankle 12'x8 Blue @ ankle 12'x8    Ther Activity                                       Gait Training                                       Modalities

## 2020-09-10 ENCOUNTER — OFFICE VISIT (OUTPATIENT)
Dept: PHYSICAL THERAPY | Facility: CLINIC | Age: 60
End: 2020-09-10
Payer: COMMERCIAL

## 2020-09-10 DIAGNOSIS — M54.42 ACUTE LEFT-SIDED LOW BACK PAIN WITH LEFT-SIDED SCIATICA: Primary | ICD-10-CM

## 2020-09-10 PROCEDURE — 97112 NEUROMUSCULAR REEDUCATION: CPT

## 2020-09-10 PROCEDURE — 97164 PT RE-EVAL EST PLAN CARE: CPT

## 2020-09-10 PROCEDURE — 97110 THERAPEUTIC EXERCISES: CPT

## 2020-09-10 NOTE — PROGRESS NOTES
Daily Note/Discharge     Today's date: 9/10/2020  Patient name: Nolberto Bartlett  : 1960  MRN: [id number]  Referring provider: Laura FridayCARROLL  Dx:   Encounter Diagnosis     ICD-10-CM    1  Acute left-sided low back pain with left-sided sciatica  M54 [id number]                   Subjective: Pt reports pain at worst has been 1/10 and she reports that she feels she is 95% improved since her IE  Pt states that she is familiar with home exercises  Objective: See treatment diary below  Squat lift and box transfer (35#): able to perform with neutral spine and small pivoting steps with no lumbar rotation  No pain reported    Assessment  Assessment details: Pt is a 62 y o  female presenting after completing 10 visits of PT thus far with good HEP compliance and good compliance to PT visits  Pt with subjective improvement of 95% and significant pain rating improvement to 1/10 pain at worst  Pt with significant strength improvements in hip mm  And demonstrates strong core activation  Pt is familiar with lifting mechanics per ability with box transfer; pt is also familiar with HEP  Pt is appropriate for discharge from PT at this point as she has met all goals  Impairments: abnormal muscle tone, impaired physical strength, lacks appropriate home exercise program, pain with function and poor body mechanics  Functional limitations: fear of lifting objects from floor, pain with lifting, pain with prolonged sitting/standing  Symptom irritability: moderateUnderstanding of Dx/Px/POC: good   Prognosis: good    Goals  1  Pt will demonstrate understanding of HEP and POC in order to improve compliance with course of rehab in 2 weeks  (MET)  2  Pt will demonstrate awareness of appropriate posture/biomechanics with dynamic reaching activities and lifting in order to decrease excessive loads/pain with ADL's by d/c (MET)  3   Pt will demonstrate at least 4+/5 hip extension/abduction strength in order to decrease compensation of lumbar spine and allow pt to return to performing yard work by d/c  (MET)  4  Pt will achieve at least 80% GRS in order for pt to participate in yoga by d/c (MET)  5  Pt pain at worst to decrease to less than 2/10 in order to lift objects and perform yard work/adls by d/c  (MET)    Plan  Patient would benefit from: skilled physical therapy  Planned therapy interventions: joint mobilization, manual therapy, neuromuscular re-education, postural training, patient education, strengthening, stretching, abdominal trunk stabilization, body mechanics training, home exercise program, graded exercise, therapeutic activities and therapeutic exercise  Frequency: 1x week  Duration in weeks: 8  Treatment plan discussed with: patient        Subjective Evaluation    History of Present Illness    Pt goals: ability to perform yard work, return to performing yoga recreationally, be able to lift bag of pellets without pain, decreased fear of lifting      Quality of life: good    Pain  Current pain ratin  At best pain ratin  At worst pain ratin  Location: L lower back, (front of thigh)  Quality: sharp and dull ache  Relieving factors: medications, rest, ice and heat  Aggravating factors: lifting  Progression: improved    Treatments  Previous treatment: medication  Current treatment: medication  Patient Goals  Patient goals for therapy: decreased pain, increased strength, independence with ADLs/IADLs and return to sport/leisure activities          Objective    Lumbar ROM (R/L):  Flex: 40°  Ext: 30°  RSB: 51 5 cm  LSB: 50 5 cm    Repeated Motions:  Unremarkable       Max Open/Close: unremarkable    LQS:  Dermatomes intact B L1-S2  Myotomes: strong symmetrical B L2- S1     Strength (R;L):  Hip Flex: 4+/5; 4+/5    Hip ABD: 4+/5; 5/5   Hip Ext: 5/5; 5/5  Hip ER: 4+/5; 4+/5    Lumbar Joint Mobility:  CPA: Normal L1-S1  UPA: Normal L1-L5     Palpation: +TTP  L paraspinals, L quadratus lumborum (pain recreation with L quadratus lumborum palpation)  Note: increased L QL activity with R hip abduction (compensation noticed)  Special Tests:  SLR: negative  Femoral nn  Tension: negative B    Plan: Continue per plan of care  Precautions: None       Manuals 5/11 5/18 6/1 6/16 6/30 7/7 7/28 8/18 9/1 9/10   STM paraspinals, quadratus lumborum, gluteals  DL DL DL DL DL DL DL DL    Manual Core Stabilization  supine w/ PPT supine w/ PPT                                    Neuro Re-Ed             Deadbug  2x10 w/ PPT 10x ea  VC/TC 10x ea  VC/TC 2x5 ea  VC/TC        Paloff Press   10x 5" Green B  10x 5" Green B + rot 10x 5" Green B + rot  20x 5" Green B + rot 20x + rot Blue 20x + rot Blue    Bird Dog  LE only 10x  LE only 10x B  Mermaid (s/l leg lift)    10" 5x B         Floating table top (quadruped with knees off)             Supine deadbug core stabilization   Hip flexed, white bar press into leg 10"x5 Hip flexed, white bar press into leg 10"x5 Hip flexed, white bar press into leg 10"x5 Hip flexed, 30"x3 Hip flexed, white bar press into leg 10x5" Hip flexed, white bar press into leg 10x5" Hip flexed, white bar press into leg 15x5" Hip flexed, white bar press into leg 15x5"   Plank     mod plank; bosu w/ tennis ball cw/ccw 10x ea  mod plank; bosu w/ tennis ball cw/ccw 10x ea  Alternating superman      2x10       Kneeling core stabilization   manual perturbation 2x30"          Ther Ex             Posterior pelvic tilt 15x (HEP) Re-ed TC/VC Wall sit w/ pelvic tilt 30"x2 Wall sit w/ pelvic tilt 30"x2         S/l hip abduction  10x B 3" hold (HEP)     30x B 1#       Supine Bridge 15x 5" hold (HEP)        U/l bridge 10x ea (slow and controlled) U/l bridge 10x ea (slow and controlled)   S/L clamshell Grn 10x B (HEP)   Black 30x B np Black 30x B       Fire hydrant  2x10 ea  2x10 ea  2x10 ea  2x10 ea  2x10 red band   2x10 grn band 2x10 grn band    Prone hip ext  30x ea  30x ea             Single leg RDL    15x B         Alternating superman             Squat lift/straight leg lift (dead lift)     6" step: 9#/6# 10x ea 6" step: 9#/6# 2x10 ea  6" step: 9#/9# 2x10 ea Box lift 15,20,25# 5x ea Box lift 30# 2x5 ea Box lift 35# 2x5; box transfer to chair 2x5   Leg Press w/ AB       30# 3x10 40# 3x10 50# 3x10 50# 3x10   Squat hold + isometric ab crunch       PB on lap 5"x15 PB on lap 5"x10 PB on lap 5"x10 PB on lap 5"x10   Lateral Band Walk       Blue @ ankle 12'x8 Blue @ ankle 12'x8 Blue @ ankle 12'x8    Tests/measures          See obj      Ther Activity                                       Gait Training                                       Modalities

## 2020-09-15 ENCOUNTER — APPOINTMENT (OUTPATIENT)
Dept: PHYSICAL THERAPY | Facility: CLINIC | Age: 60
End: 2020-09-15
Payer: COMMERCIAL

## 2020-09-22 ENCOUNTER — APPOINTMENT (OUTPATIENT)
Dept: PHYSICAL THERAPY | Facility: CLINIC | Age: 60
End: 2020-09-22
Payer: COMMERCIAL

## 2020-09-29 ENCOUNTER — APPOINTMENT (OUTPATIENT)
Dept: PHYSICAL THERAPY | Facility: CLINIC | Age: 60
End: 2020-09-29
Payer: COMMERCIAL

## 2020-12-29 ENCOUNTER — VBI (OUTPATIENT)
Dept: ADMINISTRATIVE | Facility: OTHER | Age: 60
End: 2020-12-29

## 2021-01-13 ENCOUNTER — IMMUNIZATIONS (OUTPATIENT)
Dept: FAMILY MEDICINE CLINIC | Facility: HOSPITAL | Age: 61
End: 2021-01-13

## 2021-01-13 DIAGNOSIS — Z23 ENCOUNTER FOR IMMUNIZATION: ICD-10-CM

## 2021-01-13 PROCEDURE — 0011A SARS-COV-2 / COVID-19 MRNA VACCINE (MODERNA) 100 MCG: CPT

## 2021-01-13 PROCEDURE — 91301 SARS-COV-2 / COVID-19 MRNA VACCINE (MODERNA) 100 MCG: CPT

## 2021-02-12 ENCOUNTER — IMMUNIZATIONS (OUTPATIENT)
Dept: FAMILY MEDICINE CLINIC | Facility: HOSPITAL | Age: 61
End: 2021-02-12

## 2021-02-12 DIAGNOSIS — Z23 ENCOUNTER FOR IMMUNIZATION: Primary | ICD-10-CM

## 2021-02-12 PROCEDURE — 0012A SARS-COV-2 / COVID-19 MRNA VACCINE (MODERNA) 100 MCG: CPT

## 2021-02-12 PROCEDURE — 91301 SARS-COV-2 / COVID-19 MRNA VACCINE (MODERNA) 100 MCG: CPT

## 2021-08-05 ENCOUNTER — TELEPHONE (OUTPATIENT)
Dept: FAMILY MEDICINE CLINIC | Facility: CLINIC | Age: 61
End: 2021-08-05

## 2021-08-05 NOTE — TELEPHONE ENCOUNTER
LMOM for patient since she used to see Jena Martinez who she would like to follow here and schedule physical

## 2021-12-17 ENCOUNTER — CLINICAL SUPPORT (OUTPATIENT)
Dept: FAMILY MEDICINE CLINIC | Facility: CLINIC | Age: 61
End: 2021-12-17

## 2021-12-17 DIAGNOSIS — Z20.822 EXPOSURE TO COVID-19 VIRUS: Primary | ICD-10-CM

## 2021-12-17 PROCEDURE — U0005 INFEC AGEN DETEC AMPLI PROBE: HCPCS | Performed by: FAMILY MEDICINE

## 2021-12-17 PROCEDURE — U0003 INFECTIOUS AGENT DETECTION BY NUCLEIC ACID (DNA OR RNA); SEVERE ACUTE RESPIRATORY SYNDROME CORONAVIRUS 2 (SARS-COV-2) (CORONAVIRUS DISEASE [COVID-19]), AMPLIFIED PROBE TECHNIQUE, MAKING USE OF HIGH THROUGHPUT TECHNOLOGIES AS DESCRIBED BY CMS-2020-01-R: HCPCS | Performed by: FAMILY MEDICINE

## 2021-12-18 LAB — SARS-COV-2 RNA RESP QL NAA+PROBE: NEGATIVE

## 2021-12-20 ENCOUNTER — TELEPHONE (OUTPATIENT)
Dept: FAMILY MEDICINE CLINIC | Facility: CLINIC | Age: 61
End: 2021-12-20

## 2021-12-21 ENCOUNTER — TELEPHONE (OUTPATIENT)
Dept: FAMILY MEDICINE CLINIC | Facility: CLINIC | Age: 61
End: 2021-12-21

## 2025-06-04 ENCOUNTER — VBI (OUTPATIENT)
Dept: ADMINISTRATIVE | Facility: OTHER | Age: 65
End: 2025-06-04

## 2025-06-04 NOTE — TELEPHONE ENCOUNTER
06/04/25 3:19 PM     Chart reviewed for CRC: Colonoscopy ; nothing is submitted to the patient's insurance at this time.     Daksha Delaney [facility]